# Patient Record
Sex: FEMALE | Race: BLACK OR AFRICAN AMERICAN | NOT HISPANIC OR LATINO | URBAN - METROPOLITAN AREA
[De-identification: names, ages, dates, MRNs, and addresses within clinical notes are randomized per-mention and may not be internally consistent; named-entity substitution may affect disease eponyms.]

---

## 2019-10-03 ENCOUNTER — EMERGENCY (EMERGENCY)
Facility: HOSPITAL | Age: 29
LOS: 1 days | Discharge: HOME | End: 2019-10-03
Attending: EMERGENCY MEDICINE | Admitting: EMERGENCY MEDICINE
Payer: COMMERCIAL

## 2019-10-03 VITALS
SYSTOLIC BLOOD PRESSURE: 122 MMHG | RESPIRATION RATE: 18 BRPM | HEART RATE: 68 BPM | DIASTOLIC BLOOD PRESSURE: 87 MMHG | OXYGEN SATURATION: 98 %

## 2019-10-03 VITALS
TEMPERATURE: 98 F | HEART RATE: 73 BPM | RESPIRATION RATE: 18 BRPM | OXYGEN SATURATION: 96 % | DIASTOLIC BLOOD PRESSURE: 96 MMHG | HEIGHT: 66 IN | WEIGHT: 175.71 LBS | SYSTOLIC BLOOD PRESSURE: 135 MMHG

## 2019-10-03 DIAGNOSIS — I10 ESSENTIAL (PRIMARY) HYPERTENSION: ICD-10-CM

## 2019-10-03 DIAGNOSIS — R51 HEADACHE: ICD-10-CM

## 2019-10-03 PROCEDURE — 99283 EMERGENCY DEPT VISIT LOW MDM: CPT

## 2019-10-03 PROCEDURE — 99284 EMERGENCY DEPT VISIT MOD MDM: CPT

## 2019-10-03 RX ORDER — METOCLOPRAMIDE HCL 10 MG
10 TABLET ORAL ONCE
Refills: 0 | Status: DISCONTINUED | OUTPATIENT
Start: 2019-10-03 | End: 2019-10-22

## 2019-10-03 RX ORDER — SODIUM CHLORIDE 9 MG/ML
1000 INJECTION INTRAMUSCULAR; INTRAVENOUS; SUBCUTANEOUS ONCE
Refills: 0 | Status: DISCONTINUED | OUTPATIENT
Start: 2019-10-03 | End: 2019-10-22

## 2019-10-03 RX ORDER — KETOROLAC TROMETHAMINE 30 MG/ML
30 SYRINGE (ML) INJECTION ONCE
Refills: 0 | Status: COMPLETED | OUTPATIENT
Start: 2019-10-03 | End: 2019-10-03

## 2019-10-03 RX ORDER — DIPHENHYDRAMINE HCL 50 MG
25 CAPSULE ORAL ONCE
Refills: 0 | Status: DISCONTINUED | OUTPATIENT
Start: 2019-10-03 | End: 2019-10-22

## 2019-10-03 NOTE — ED ADULT NURSE NOTE - NSIMPLEMENTINTERV_GEN_ALL_ED
Implemented All Universal Safety Interventions:  [place] to call system. Call bell, personal items and telephone within reach. Instruct patient to call for assistance. Room bathroom lighting operational. Non-slip footwear when patient is off stretcher. Physically safe environment: no spills, clutter or unnecessary equipment. Stretcher in lowest position, wheels locked, appropriate side rails in place.

## 2019-10-03 NOTE — ED PROVIDER NOTE - CLINICAL SUMMARY MEDICAL DECISION MAKING FREE TEXT BOX
Patient  with h/o elevated b/p currently symptomatic with normal tensive b/p. No focal neuro findings. Recommend to keep scheduled appt today.

## 2019-10-03 NOTE — ED ADULT TRIAGE NOTE - CHIEF COMPLAINT QUOTE
c/o occipital headache with quotes "seeing black spots since this morning," repost getting headaches frequently

## 2019-10-03 NOTE — ED PROVIDER NOTE - CHPI ED SYMPTOMS NEG
no dizziness/no fever/no nausea/no chest pain, no SOB, no blurry vision, no leg swelling, no abdominal pain, no cold symptoms, no calf pain./no chills/no vomiting

## 2019-10-03 NOTE — ED ADULT NURSE NOTE - OBJECTIVE STATEMENT
patient states that since she gave birth in April she has been experiencing a lot of stress. she states that every so often she feels a pressure in the back of her head with throat tightness. over the last few days patient reports feeling the symptoms and had her BP checked last night and this morning where she was hypertensive 150/112. denies chest pain, sob, fever, chills, states that the headache has subsided at this time.

## 2019-10-03 NOTE — ED PROVIDER NOTE - ATTENDING CONTRIBUTION TO CARE
Pt.  with h/o elevated b/p currently symptomatic with normal tensive b/p. No focal neuro findings. HA managed in the ED.  Recommend to keep scheduled appt today.

## 2019-10-03 NOTE — ED PROVIDER NOTE - OBJECTIVE STATEMENT
28 y/o F no PMHx presents to the ED due to elevated BP. Pt admits to having intermittently elevated BP this past month with associated unilateral headache and pressure in her head. Pt is currently postpartum for 5 months, no complications with pregnancy, no gestational HTN,. Pt denies dizziness, chest pain, SOB, blurry vision, leg swelling, abdominal pain, nausea, vomiting, fever, cold symptoms, or calf pain. Pt reports BP at home was 160/109; however BP has decreased in the /87. Pt has PMD appointment today to f/u for elevated BP. 30 y/o F no PMHx presents to the ED due to elevated BP. Pt admits to having intermittently elevated BP this past month with associated unilateral headache and pressure in her head. Pt is currently postpartum for 5 months, no complications with pregnancy, no gestational HTN,. Pt denies dizziness, chest pain, SOB, blurry vision, leg swelling, abdominal pain, nausea, vomiting, fever, cold symptoms, or calf pain. Pt reports BP at work today was 160/109; however BP has decreased in the /87. Pt has PMD appointment today to f/u for elevated BP.

## 2019-10-03 NOTE — ED ADULT TRIAGE NOTE - ARRIVAL INFO ADDITIONAL COMMENTS
Pt states her BP has been high for a couple of months with tightness in her throat. Reports has an appt later today.

## 2019-10-03 NOTE — ED PROVIDER NOTE - PATIENT PORTAL LINK FT
You can access the FollowMyHealth Patient Portal offered by [facility] by registering at the following website: [email or web address]. By joining [employer]’s FollowMyHealth portal, you will also be able to view your health information using other applications (apps) compatible with our system.

## 2019-10-03 NOTE — ED PROVIDER NOTE - NSFOLLOWUPINSTRUCTIONS_ED_ALL_ED_FT
Agustina    How to Take Your Blood Pressure  Blood pressure is a measurement of how strongly your blood is pressing against the walls of your arteries. Arteries are blood vessels that carry blood from your heart throughout your body. Your health care provider takes your blood pressure at each office visit. You can also take your own blood pressure at home with a blood pressure machine. You may need to take your own blood pressure:  To confirm a diagnosis of high blood pressure (hypertension).To monitor your blood pressure over time.To make sure your blood pressure medicine is working.Supplies needed:  ImageTo take your blood pressure, you will need a blood pressure machine. You can buy a blood pressure machine, or blood pressure monitor, at most [employer] or online. There are several types of home blood pressure monitors. When choosing one, consider the following:  Choose a monitor that has an arm cuff.Choose a cuff that wraps snugly around your upper arm. You should be able to fit only one finger between your arm and the cuff.Do not choose a monitor that measures your blood pressure from your wrist or finger.Your health care provider can suggest a reliable monitor that will meet your needs.  How to prepare  To get the most accurate reading, avoid the following for 30 minutes before you check your blood pressure:  Drinking caffeine.Drinking alcohol.Eating.Smoking.Exercising.Five minutes before you check your blood pressure:  Empty your bladder.Sit quietly without talking in a dining chair, rather than in a soft couch or armchair.How to take your blood pressure  To check your blood pressure, follow the instructions in the manual that came with your blood pressure monitor. If you have a digital blood pressure monitor, the instructions may be as follows:  Sit up straight.  Place your feet on the floor. Do not cross your ankles or legs.  Rest your left arm at the level of your heart on a table or desk or on the arm of a chair.  Pull up your shirt sleeve.  Wrap the blood pressure cuff around the upper part of your left arm, 1 inch (2.5 cm) above your elbow. It is best to wrap the cuff around bare skin.  Fit the cuff snugly around your arm. You should be able to place only one finger between the cuff and your arm.  Position the cord inside the groove of your elbow.  Press the power button.  Sit quietly while the cuff inflates and deflates.  Read the digital reading on the monitor screen and write it down (record it).  Wait 2–3 minutes, then repeat the steps, starting at step 1.  What does my blood pressure reading mean?  A blood pressure reading consists of a higher number over a lower number. Ideally, your blood pressure should be below 120/80. The first ("top") number is called the systolic pressure. It is a measure of the pressure in your arteries as your heart beats. The second ("bottom") number is called the diastolic pressure. It is a measure of the pressure in your arteries as the heart relaxes.  Blood pressure is classified into four stages. The following are the stages for adults who do not have a short-term serious illness or a chronic condition. Systolic pressure and diastolic pressure are measured in a unit called mm Hg.  Normal     Systolic pressure: below 120.Diastolic pressure: below 80.Elevated     Systolic pressure: 120–129.Diastolic pressure: below 80.Hypertension stage 1     Systolic pressure: 130–139.Diastolic pressure: 80–89.Hypertension stage 2     Systolic pressure: 140 or above.Diastolic pressure: 90 or above.You can have prehypertension or hypertension even if only the systolic or only the diastolic number in your reading is higher than normal.  Follow these instructions at home:  Check your blood pressure as often as recommended by your health care provider.Take your monitor to the next appointment with your health care provider to make sure:  That you are using it correctly.That it provides accurate readings.Be sure you understand what your goal blood pressure numbers are.Tell your health care provider if you are having any side effects from blood pressure medicine.Contact a health care provider if:  Your blood pressure is consistently high.Get help right away if:  Your systolic blood pressure is higher than 180.Your diastolic blood pressure is higher than 110.This information is not intended to replace advice given to you by your health care provider. Make sure you discuss any questions you have with your health care provider.    Document Released: 05/26/2017 Document Revised: 10/30/2018 Document Reviewed: 05/26/2017  Elsevier Interactive Patient Education © 2019 [employer]

## 2019-10-15 PROBLEM — Z78.9 OTHER SPECIFIED HEALTH STATUS: Chronic | Status: ACTIVE | Noted: 2019-10-03

## 2019-10-16 ENCOUNTER — APPOINTMENT (OUTPATIENT)
Dept: ENDOCRINOLOGY | Facility: CLINIC | Age: 29
End: 2019-10-16
Payer: COMMERCIAL

## 2019-10-16 VITALS
DIASTOLIC BLOOD PRESSURE: 88 MMHG | HEART RATE: 92 BPM | WEIGHT: 177 LBS | SYSTOLIC BLOOD PRESSURE: 125 MMHG | BODY MASS INDEX: 28.45 KG/M2 | HEIGHT: 66 IN

## 2019-10-16 DIAGNOSIS — E04.2 NONTOXIC MULTINODULAR GOITER: ICD-10-CM

## 2019-10-16 DIAGNOSIS — J30.2 OTHER SEASONAL ALLERGIC RHINITIS: ICD-10-CM

## 2019-10-16 DIAGNOSIS — Z83.3 FAMILY HISTORY OF DIABETES MELLITUS: ICD-10-CM

## 2019-10-16 DIAGNOSIS — Z83.49 FAMILY HISTORY OF OTHER ENDOCRINE, NUTRITIONAL AND METABOLIC DISEASES: ICD-10-CM

## 2019-10-16 PROBLEM — Z00.00 ENCOUNTER FOR PREVENTIVE HEALTH EXAMINATION: Status: ACTIVE | Noted: 2019-10-16

## 2019-10-16 PROCEDURE — 99204 OFFICE O/P NEW MOD 45 MIN: CPT

## 2019-10-29 ENCOUNTER — FORM ENCOUNTER (OUTPATIENT)
Age: 29
End: 2019-10-29

## 2019-10-30 ENCOUNTER — RESULT REVIEW (OUTPATIENT)
Age: 29
End: 2019-10-30

## 2019-10-30 ENCOUNTER — APPOINTMENT (OUTPATIENT)
Dept: ULTRASOUND IMAGING | Facility: HOSPITAL | Age: 29
End: 2019-10-30
Payer: COMMERCIAL

## 2019-10-30 ENCOUNTER — OUTPATIENT (OUTPATIENT)
Dept: OUTPATIENT SERVICES | Facility: HOSPITAL | Age: 29
LOS: 1 days | End: 2019-10-30
Payer: COMMERCIAL

## 2019-10-30 PROCEDURE — 10006 FNA BX W/US GDN EA ADDL: CPT

## 2019-10-30 PROCEDURE — 88305 TISSUE EXAM BY PATHOLOGIST: CPT

## 2019-10-30 PROCEDURE — 10005 FNA BX W/US GDN 1ST LES: CPT

## 2019-10-30 PROCEDURE — 88173 CYTOPATH EVAL FNA REPORT: CPT

## 2019-11-01 LAB
NON-GYNECOLOGICAL CYTOLOGY STUDY: SIGNIFICANT CHANGE UP
NON-GYNECOLOGICAL CYTOLOGY STUDY: SIGNIFICANT CHANGE UP

## 2019-11-04 NOTE — HISTORY OF PRESENT ILLNESS
[FreeTextEntry1] : Ms. Grover is a 29 year-old woman with a history of nontoxic multinodular goiter presenting to [facility] care with me.\par \par Nontoxic multinodular goiter.\par She was diagnosed with nontoxic multinodular goiter many years ago. Her most recent thyroid ultrasound in October 2019 demonstrated findings as below, with a right upper pole isoechoic 1.1 x 0.7 cm nodule, right midpole 1.7 x 1.0 cm nodule, right midpole isoechoic 1.9 x 0.9 cm nodule, left midpole hypoechoic 2.6 x 1.2 cm nodule, and left mid to lower pole isoechoic 2.6 x 1.6 cm nodule.\par She has been clinically and biochemically euthyroid.\par No history of radiation exposure.\par Mother and sister with history of hypothyroidism. No history of thyroid or other endocrine cancer.\par \par She feels pressure in her neck. She has some dyspnea while supine. She has some dysphagia for solids. She has occasional headaches, nausea, polyuria. She has had anxiety, irritability, and mood swings postpartum; her son is six months old.

## 2019-11-04 NOTE — ASSESSMENT
[FreeTextEntry1] : Nontoxic multinodular goiter. She is clinically and biochemically euthyroid. Recent thyroid ultrasound demonstrated four nodules meeting criteria for biopsy: right midpole 1.7 x 1.0 cm nodule, right midpole isoechoic 1.9 x 0.9 cm nodule, left midpole hypoechoic 2.6 x 1.2 cm nodule, and left mid to lower pole isoechoic 2.6 x 1.6 cm nodule. We discussed biopsy of the left midpole hypoechoic 2.6 x 1.2 cm nodule and left mid to lower pole isoechoic 2.6 x 1.6 cm nodule at this time. We discussed that approximately 95 percent of all thyroid nodules are caused by benign conditions. We discussed the procedure for fine needle aspiration of thyroid nodules and possible results, including further testing for atypia of undetermined significance. She will call after biopsy to discuss results. Since she is having compressive symptoms, we discussed referral to a thyroid surgeon to discuss the risks and benefits of thyroidectomy.\par \par I advised she discuss her other symptoms, including nausea, anxiety, irritability, and mood swings, with her primary care provider.\par \par CC:\par Dr. Martha Douglass, Fax [phone]

## 2019-11-04 NOTE — DATA REVIEWED
[FreeTextEntry1] : Laboratories (October 7, 2019) reviewed and significant for:\par TSH 1.330 uIU/mL\par \par Thyroid ultrasound (October 14, 2019) reviewed and significant for:\par ISTHMUS: Thickened in size in AP dimension measuring 0.8 cm.\par RIGHT LOBE:  Measures 7.1 x 1.8 x 2.6 cm in sagittal x AP x transverse dimensions.  Volume 17.1 cc.\par ARCHITECTURE: Isoechoic solid well-circumscribed nodule in the upper pole measures 1.1 x 0.7 cm. Mostly solid nodule in the midpole measures 1.7 x 1.0 cm. The borders are well-circumscribed with a hypoechoic rim. Solid isoechoic nodule in the midpole with well-circumscribed hypoechoic rim measures 1.9 x 0.9 cm.\par COLOR DOPPLER:  Unremarkable. \par LEFT LOBE:    Measures 6.9 x 2.2 x 2.6 cm in sagittal x AP x transverse dimensions. Volume 20.8 cc.\par ARCHITECTURE: Hypoechoic solid nodule in the midpole measures 2.6 x 1.2 cm. There are no echogenic foci or macrocalcifications. The borders are well-circumscribed. Solid isoechoic well-circumscribed nodule in the mid to lower pole measures 2.6 x 1.6 cm.\par COLOR DOPPLER:  Unremarkable. \par IMPRESSION:\par Enlarged multinodular thyroid gland. Recommend FNA biopsy of 2.6 cm hypoechoic solid nodule in the midpole left lobe.

## 2019-11-04 NOTE — PHYSICAL EXAM
[Alert] : alert [No Acute Distress] : no acute distress [Healthy Appearance] : healthy appearance [Normal Sclera/Conjunctiva] : normal sclera/conjunctiva [Normal Oropharynx] : the oropharynx was normal [Supple] : the neck was supple [No LAD] : no lymphadenopathy [Normal Rate and Effort] : normal respiratory rhythm and effort [Clear to Auscultation] : lungs were clear to auscultation bilaterally [Normal Rate] : heart rate was normal  [Normal S1, S2] : normal S1 and S2 [Regular Rhythm] : with a regular rhythm [No Stigmata of Cushings Syndrome] : no stigmata of cushings syndrome [Normal Gait] : normal gait [Normal Insight/Judgement] : insight and judgment were intact [Kyphosis] : no kyphosis present [Acanthosis Nigricans] : no acanthosis nigricans [de-identified] : thyroid enlarged, left > right; nodular bilaterally [de-identified] : no moon facies, no supraclavicular fat pads

## 2019-11-04 NOTE — ADDENDUM
[FreeTextEntry1] : Biopsy of the left midpole hypoechoic 2.6 x 1.2 cm nodule and left mid to lower pole isoechoic 2.6 x 1.6 cm nodule both Eugene II (benign). I spoke with Ms. Grover about these reassuring results. She believes the symptoms of neck pressure, dyspnea while supine, and dysphagia for solids were due to stress, since they have since resolved. I advised her to call me immediately if symptoms return. We will plan to biopsy the right midpole 1.7 x 1.0 cm nodule and right midpole isoechoic 1.9 x 0.9 cm nodule in two months. She will call to schedule biopsy and call me after to discuss results. 11/04/19

## 2019-11-10 ENCOUNTER — MOBILE ON CALL (OUTPATIENT)
Age: 29
End: 2019-11-10

## 2020-03-18 ENCOUNTER — APPOINTMENT (OUTPATIENT)
Dept: OBGYN | Facility: CLINIC | Age: 30
End: 2020-03-18